# Patient Record
Sex: FEMALE | Race: WHITE
[De-identification: names, ages, dates, MRNs, and addresses within clinical notes are randomized per-mention and may not be internally consistent; named-entity substitution may affect disease eponyms.]

---

## 2023-02-07 ENCOUNTER — HOSPITAL ENCOUNTER (OUTPATIENT)
Dept: HOSPITAL 75 - ORTHO | Age: 73
End: 2023-02-07
Attending: ORTHOPAEDIC SURGERY
Payer: MEDICARE

## 2023-02-07 DIAGNOSIS — M16.12: Primary | ICD-10-CM

## 2023-02-07 PROCEDURE — 99213 OFFICE O/P EST LOW 20 MIN: CPT

## 2023-02-07 PROCEDURE — 73502 X-RAY EXAM HIP UNI 2-3 VIEWS: CPT

## 2023-02-07 NOTE — DIAGNOSTIC IMAGING REPORT
EXAMINATION: Left hip radiographs, 2 views.



COMPARISON: None. 



HISTORY: 72-year-old female, left hip pain. 



FINDINGS: There is sclerosis in the left femoral head very likely

relating to avascular necrosis. There is a subchondral lucency

likely reflecting subchondral collapse. There is moderate to

severe joint space loss of the left hip. There is osteophyte

formation. The left hip is not dislocated. There are pelvic

calcifications likely reflecting phleboliths. There is a

partially imaged probable right hip prosthesis. There are

degenerative changes of the imaged lower lumbar spine. 



IMPRESSION: 

1. Findings suspicious for avascular necrosis of the left femoral

head and subchondral collapse.

2. Advanced osteoarthritis of the left hip. 



Dictated by: 



  Dictated on workstation # [id number]

## 2023-02-27 ENCOUNTER — HOSPITAL ENCOUNTER (OUTPATIENT)
Dept: HOSPITAL 75 - PREOP | Age: 73
End: 2023-02-27
Attending: ORTHOPAEDIC SURGERY
Payer: MEDICARE

## 2023-02-27 VITALS — DIASTOLIC BLOOD PRESSURE: 61 MMHG | SYSTOLIC BLOOD PRESSURE: 124 MMHG

## 2023-02-27 VITALS — HEIGHT: 65 IN | BODY MASS INDEX: 30.05 KG/M2 | WEIGHT: 180.34 LBS

## 2023-02-27 DIAGNOSIS — Z01.818: Primary | ICD-10-CM

## 2023-02-27 LAB
APTT PPP: YELLOW S
BACTERIA #/AREA URNS HPF: (no result) /HPF
BASOPHILS # BLD AUTO: 0.1 10^3/UL (ref 0–0.1)
BASOPHILS NFR BLD AUTO: 1 % (ref 0–10)
BILIRUB UR QL STRIP: NEGATIVE
BUN/CREAT SERPL: 17
CALCIUM SERPL-MCNC: 9.5 MG/DL (ref 8.5–10.1)
CHLORIDE SERPL-SCNC: 105 MMOL/L (ref 98–107)
CO2 SERPL-SCNC: 27 MMOL/L (ref 21–32)
CREAT SERPL-MCNC: 0.81 MG/DL (ref 0.6–1.3)
EOSINOPHIL # BLD AUTO: 0.2 10^3/UL (ref 0–0.3)
EOSINOPHIL NFR BLD AUTO: 2 % (ref 0–10)
FIBRINOGEN PPP-MCNC: CLEAR MG/DL
GFR SERPLBLD BASED ON 1.73 SQ M-ARVRAT: 78 ML/MIN
GLUCOSE SERPL-MCNC: 87 MG/DL (ref 70–105)
GLUCOSE UR STRIP-MCNC: NEGATIVE MG/DL
HCT VFR BLD CALC: 41 % (ref 35–52)
HGB BLD-MCNC: 13.4 G/DL (ref 11.5–16)
KETONES UR QL STRIP: NEGATIVE
LEUKOCYTE ESTERASE UR QL STRIP: NEGATIVE
LYMPHOCYTES # BLD AUTO: 2.2 10^3/UL (ref 1–4)
LYMPHOCYTES NFR BLD AUTO: 23 % (ref 12–44)
MANUAL DIFFERENTIAL PERFORMED BLD QL: NO
MCH RBC QN AUTO: 31 PG (ref 25–34)
MCHC RBC AUTO-ENTMCNC: 33 G/DL (ref 32–36)
MCV RBC AUTO: 94 FL (ref 80–99)
MONOCYTES # BLD AUTO: 0.8 10^3/UL (ref 0–1)
MONOCYTES NFR BLD AUTO: 9 % (ref 0–12)
NEUTROPHILS # BLD AUTO: 6.1 10^3/UL (ref 1.8–7.8)
NEUTROPHILS NFR BLD AUTO: 65 % (ref 42–75)
NITRITE UR QL STRIP: NEGATIVE
PH UR STRIP: 6 [PH] (ref 5–9)
PLATELET # BLD: 252 10^3/UL (ref 130–400)
PMV BLD AUTO: 10.7 FL (ref 9–12.2)
POTASSIUM SERPL-SCNC: 3.9 MMOL/L (ref 3.6–5)
PROT UR QL STRIP: NEGATIVE
RBC #/AREA URNS HPF: (no result) /HPF
SODIUM SERPL-SCNC: 141 MMOL/L (ref 135–145)
SP GR UR STRIP: 1.02 (ref 1.02–1.02)
SQUAMOUS #/AREA URNS HPF: (no result) /HPF
WBC # BLD AUTO: 9.4 10^3/UL (ref 4.3–11)
WBC #/AREA URNS HPF: (no result) /HPF

## 2023-02-27 PROCEDURE — 71046 X-RAY EXAM CHEST 2 VIEWS: CPT

## 2023-02-27 PROCEDURE — 81000 URINALYSIS NONAUTO W/SCOPE: CPT

## 2023-02-27 PROCEDURE — 80048 BASIC METABOLIC PNL TOTAL CA: CPT

## 2023-02-27 PROCEDURE — 87081 CULTURE SCREEN ONLY: CPT

## 2023-02-27 PROCEDURE — 93005 ELECTROCARDIOGRAM TRACING: CPT

## 2023-02-27 PROCEDURE — 36415 COLL VENOUS BLD VENIPUNCTURE: CPT

## 2023-02-27 PROCEDURE — 85025 COMPLETE CBC W/AUTO DIFF WBC: CPT

## 2023-02-27 NOTE — DIAGNOSTIC IMAGING REPORT
INDICATION:  Preoperative evaluation, undergoing left total hip

arthroplasty..  



TECHNIQUE:  Two view chest   8:31 AM



CORRELATION STUDY:   None



FINDINGS: 

The heart size, mediastinal configuration and pulmonary

vasculature are within normal limits.  

The lungs are clear with no consolidating infiltrate. There is no

significant pleural effusion or pneumothorax.  

Rather advanced degenerative changes thoracic spine with disc

space narrowing and osteophyte formation.



IMPRESSION: 

1. Negative for acute abnormality of the chest.



Dictated by: 



  Dictated on workstation # [id number]

## 2023-03-06 ENCOUNTER — HOSPITAL ENCOUNTER (OUTPATIENT)
Dept: HOSPITAL 75 - SDC | Age: 73
LOS: 2 days | Discharge: HOME | End: 2023-03-08
Attending: ORTHOPAEDIC SURGERY
Payer: MEDICARE

## 2023-03-06 VITALS — DIASTOLIC BLOOD PRESSURE: 115 MMHG | SYSTOLIC BLOOD PRESSURE: 143 MMHG

## 2023-03-06 VITALS — DIASTOLIC BLOOD PRESSURE: 66 MMHG | SYSTOLIC BLOOD PRESSURE: 135 MMHG

## 2023-03-06 VITALS — DIASTOLIC BLOOD PRESSURE: 64 MMHG | SYSTOLIC BLOOD PRESSURE: 147 MMHG

## 2023-03-06 VITALS — WEIGHT: 180.34 LBS | HEIGHT: 64.96 IN | BODY MASS INDEX: 30.05 KG/M2

## 2023-03-06 VITALS — SYSTOLIC BLOOD PRESSURE: 109 MMHG | DIASTOLIC BLOOD PRESSURE: 61 MMHG

## 2023-03-06 VITALS — DIASTOLIC BLOOD PRESSURE: 87 MMHG | SYSTOLIC BLOOD PRESSURE: 153 MMHG

## 2023-03-06 VITALS — DIASTOLIC BLOOD PRESSURE: 73 MMHG | SYSTOLIC BLOOD PRESSURE: 139 MMHG

## 2023-03-06 VITALS — DIASTOLIC BLOOD PRESSURE: 82 MMHG | SYSTOLIC BLOOD PRESSURE: 160 MMHG

## 2023-03-06 VITALS — DIASTOLIC BLOOD PRESSURE: 54 MMHG | SYSTOLIC BLOOD PRESSURE: 119 MMHG

## 2023-03-06 VITALS — DIASTOLIC BLOOD PRESSURE: 82 MMHG | SYSTOLIC BLOOD PRESSURE: 169 MMHG

## 2023-03-06 VITALS — SYSTOLIC BLOOD PRESSURE: 166 MMHG | DIASTOLIC BLOOD PRESSURE: 83 MMHG

## 2023-03-06 VITALS — SYSTOLIC BLOOD PRESSURE: 126 MMHG | DIASTOLIC BLOOD PRESSURE: 62 MMHG

## 2023-03-06 DIAGNOSIS — G89.18: ICD-10-CM

## 2023-03-06 DIAGNOSIS — E11.42: ICD-10-CM

## 2023-03-06 DIAGNOSIS — F17.210: ICD-10-CM

## 2023-03-06 DIAGNOSIS — E78.5: ICD-10-CM

## 2023-03-06 DIAGNOSIS — I50.9: ICD-10-CM

## 2023-03-06 DIAGNOSIS — J44.9: ICD-10-CM

## 2023-03-06 DIAGNOSIS — I11.0: ICD-10-CM

## 2023-03-06 DIAGNOSIS — M87.852: Primary | ICD-10-CM

## 2023-03-06 DIAGNOSIS — E66.9: ICD-10-CM

## 2023-03-06 PROCEDURE — 85007 BL SMEAR W/DIFF WBC COUNT: CPT

## 2023-03-06 PROCEDURE — 80053 COMPREHEN METABOLIC PANEL: CPT

## 2023-03-06 PROCEDURE — 82947 ASSAY GLUCOSE BLOOD QUANT: CPT

## 2023-03-06 PROCEDURE — 36415 COLL VENOUS BLD VENIPUNCTURE: CPT

## 2023-03-06 PROCEDURE — 94664 DEMO&/EVAL PT USE INHALER: CPT

## 2023-03-06 PROCEDURE — 85027 COMPLETE CBC AUTOMATED: CPT

## 2023-03-06 PROCEDURE — 85025 COMPLETE CBC W/AUTO DIFF WBC: CPT

## 2023-03-06 PROCEDURE — 72170 X-RAY EXAM OF PELVIS: CPT

## 2023-03-06 PROCEDURE — 87081 CULTURE SCREEN ONLY: CPT

## 2023-03-06 PROCEDURE — 94760 N-INVAS EAR/PLS OXIMETRY 1: CPT

## 2023-03-06 PROCEDURE — 94640 AIRWAY INHALATION TREATMENT: CPT

## 2023-03-06 RX ADMIN — SODIUM CHLORIDE SCH MLS/HR: 900 INJECTION, SOLUTION INTRAVENOUS at 11:22

## 2023-03-06 RX ADMIN — ROSUVASTATIN CALCIUM SCH MG: 20 TABLET, FILM COATED ORAL at 20:05

## 2023-03-06 RX ADMIN — SODIUM CHLORIDE SCH MLS/HR: 900 INJECTION, SOLUTION INTRAVENOUS at 13:12

## 2023-03-06 RX ADMIN — FLUTICASONE PROPIONATE AND SALMETEROL SCH EACH: 232; 14 POWDER, METERED RESPIRATORY (INHALATION) at 19:15

## 2023-03-06 RX ADMIN — ACETAMINOPHEN, DIPHENHYDRAMINE HCL, PHENYLEPHRINE HCL SCH MG: 325; 25; 5 TABLET ORAL at 20:05

## 2023-03-06 RX ADMIN — SODIUM CHLORIDE, SODIUM LACTATE, POTASSIUM CHLORIDE, AND CALCIUM CHLORIDE PRN MLS/HR: 600; 310; 30; 20 INJECTION, SOLUTION INTRAVENOUS at 07:07

## 2023-03-06 RX ADMIN — SODIUM CHLORIDE, SODIUM LACTATE, POTASSIUM CHLORIDE, AND CALCIUM CHLORIDE PRN MLS/HR: 600; 310; 30; 20 INJECTION, SOLUTION INTRAVENOUS at 08:28

## 2023-03-06 RX ADMIN — DOCUSATE SODIUM SCH MG: 100 CAPSULE ORAL at 20:05

## 2023-03-06 RX ADMIN — SODIUM CHLORIDE SCH MLS/HR: 900 INJECTION, SOLUTION INTRAVENOUS at 15:40

## 2023-03-06 RX ADMIN — ASPIRIN SCH MG: 81 TABLET ORAL at 17:00

## 2023-03-06 RX ADMIN — CELECOXIB SCH MG: 100 CAPSULE ORAL at 20:06

## 2023-03-06 RX ADMIN — NICOTINE SCH MG: 21 PATCH, EXTENDED RELEASE TRANSDERMAL at 18:52

## 2023-03-06 RX ADMIN — SODIUM CHLORIDE SCH MLS/HR: 900 INJECTION, SOLUTION INTRAVENOUS at 23:15

## 2023-03-06 RX ADMIN — POTASSIUM CHLORIDE SCH MEQ: 750 TABLET, FILM COATED, EXTENDED RELEASE ORAL at 20:05

## 2023-03-06 NOTE — DIAGNOSTIC IMAGING REPORT
PELVIS 1 TO 2 VIEWS



INDICATION: Postop imaging immediately following left total hip

arthroplasty



COMPARISON: None available.



TECHNIQUE: AP view of the pelvis



FINDINGS: Noncemented left total hip arthroplasty has components

in good alignment. No acute periprosthetic fracture. Expected

postoperative soft tissue gas around the left hip. Right total

hip arthroplasty is also noted.



IMPRESSION: No complication after left total hip arthroplasty.



Dictated by: 



  Dictated on workstation # [id number]

## 2023-03-06 NOTE — CONSULTATION
MASOOD NUNEZ 3/6/23 1358:


HPI


History of Present Illness:


HPI/Chief Complaint


CC: s/p L LUIS FELIPE POD#0





HPI: Mai is a 73 yo F who presents following a Left LUIS FELIPE on POD#0, consulted 

for medical management. PMHx of COPD, HTN, CHF, T2DM. Med list reviewed an 

discussed with patient. Not on o2 at home, does not use cpap. Denies numbness, 

tingling, fever/chills, sob, cp, palpitations, abdominal pain, nausea, vomiting,

vision change, hearing change, hemoptysis, edema, loss of appetite, weight 

change. Endorses cough at bedside productive of white sputum, nocturnal cough, L

hip pain 8/10.


Source:  patient


Exam Limitations:  no limitations


Date Seen


3/6/23


Attending Physician


Tawny Boyd DO


PCP


Admitting Physician:


 








Attending Physician:


Thiago Suh MD


Referring Physician


Dr Suh


Date of Admission








Home Medications & Allergies


Home Medications


Reviewed patient Home Medication Reconciliation performed by pharmacy medication

reconciliations technician and/or nursing.


Patients Allergies have been reviewed.





Allergies





Allergies


Coded Allergies


  No Known Drug Allergies (Unverified2/27/23)








Past Medical-Social-Family Hx


Patient Social History


Smoking Status:  Current Everyday Smoker





Immunizations Up To Date


Date of Influenza Vaccine:  Jan 18, 2023


First/Initial COVID19 Vaccinat:  9/11/21 P


Second COVID19 Vaccination Bob:  10/2/21 P





Seasonal Allergies


Seasonal Allergies:  No





Current Status


Is interpretation needed?:  No





Past Medical History


Surgeries:  Appendectomy, Gallbladder, Hysterectomy, Tonsillectomy


Asthma, Pneumonia, Chronic Bronchitis, Sleep Apnea, COPD, Emphysema


Currently Using CPAP:  No


Currently Using BIPAP:  No


High Cholesterol, Hypertension


GYN History:  Hysterectomy


Gastroesophageal Reflux, Pancreatitis, Polyps, Gall Bladder Disease


Degenerate Disk Disease, Arthritis


Diabetes, Non-Insulin dep


Sleep Difficulties, Anxiety, Depression


Blood Disorders:  No





Review of Systems


Constitutional:  see HPI


All Other Systems Reviewed


Negative Unless Noted:  Yes





Physical Exam


Physical Exam


Vital Signs





Vital Signs - First Documented








 3/6/23





 06:35


 


Temp 36.5


 


Pulse 66


 


Resp 18


 


B/P (MAP) 139/73 (95)


 


Pulse Ox 95


 


O2 Delivery Room Air





Capillary Refill : Less Than 3 Seconds


Height, Weight, BMI


Height: '"


Weight: lbs. oz. kg; 30.04 BMI


Method:


General Appearance:  No Apparent Distress


Eyes:  Bilateral Eye Normal Inspection, Bilateral Eye PERRL, Bilateral Eye EOMI


HEENT:  PERRL/EOMI, Moist Mucous Membranes, Pharyngeal Erythema


Neck:  Normal Inspection, Non Tender, Supple; No JVD


Respiratory:  Chest Non Tender, Lungs Clear, No Respiratory Distress, Other 

(rhonci LL fields)


Cardiovascular:  Regular Rate, Rhythm, No Edema, No JVD, Other (grade 2 hol

osystolic murmur.)


Gastrointestinal:  Non Tender, Soft; No Distended, No Guarding


Extremity:  No Pedal Edema, Other (distal pulses 2+. L hip surgical wound)


Neurologic/Psychiatric:  Alert, Oriented x3, CNs II-XII Norm as Tested; No 

Sensory Deficit; Other (moves all limbs spontaneously)


Skin:  Normal Color, Warm/Dry; No Petechia





Results


Results/Procedures


Labs


Patient resulted labs reviewed.


Imaging:  Reviewed Imaging Films, Reviewed Imaging Report





Assessment/Plan


Assessment and Plan


Assess & Plan/Chief Complaint


L LUIS FELIPE POD#0


-pain meds ordered


-films reviewed


-PT/OT eval ordered





Hx COPD


-not on baseline o2


-DuoNebs


-Symbicort at home





CHF


HTN


HLD


-w/o edema on exam


-lisinopril 20mg


-lasix 20mg


-rosuvastatin 40mg





T2DM


-hold home meds


-SSI





Peripheral neuropathy


-resume home duloxetine, gabapentin





GI ppx: protonix


diet: regular


DVT ppx: 





Dispo: awaiting rehab unit placement following pt ot evaluation.





TAWNY BOYD DO 3/7/23 0448:


Review of Systems


Constitutional:  see HPI





Physical Exam


Physical Exam


General Appearance:  No Apparent Distress, WD/WN, Chronically ill


Respiratory:  Lungs Clear, Decreased Breath Sounds


Cardiovascular:  Regular Rate, Rhythm





Supervisory-Addendum Brief


Verification & Attestation


Participated in pt care:  history, MDM, physical


Personally performed:  exam, history, MDM, supervision of care


Care discussed with:  Medical Student


Procedures:  n/a


Results interpretation:  Verified all documentation


Verification and Attestation of Medical Student E/M Service





A medical student performed and documented this service in my presence. I 

reviewed and verified all information documented by the medical student and made

modifications to such information, when appropriate. I personally performed the 

physical exam and medical decision making. 





 Tawny Boyd Mar 7, 2023,04:48











MASOOD NUNEZ                Mar 6, 2023 13:58


TAWNY BOYD DO                 Mar 7, 2023 04:48

## 2023-03-06 NOTE — OPERATIVE REPORT - ORTHO
Operative Report


Surgeon (s)/Assistant (s)


Surgeon


HUSSEIN MARTÍNEZ MD


Assistant


n/a





Pre-Operative Diagnosis


Left Hip Avascular Necrosis





Post-Operative Diagnosis


same





Operative Report


Date of Procedure:  Mar 6, 2023


Name of Procedure Performed:  


Left Total Hip Arthroplasty


Description & Findings


After obtaining informed consent and marking the patient in the preoperative 

holding area, the patient did receive antibiotics and was taken to the operating

room.  General anesthesia was induced and patient was positioned in the lateral 

decubitus position with the left side up.  Left lower extremity was prepped and 

draped in the usual sterile fashion.  Surgical timeout was taken.  

Posterolateral approach was utilized.  Capsule and external rotators were taken 

down in one layer.  Femoral neck fracture line was freshened with a saw.  

Femoral head was removed and sized.  Acetabulum was exposed.  Labrum and soft 

tissue was removed from the acetabulum.  Sequential reaming was began beginning 

with a 44 mm reamer and reaming to a 50 mm.  50 mm acetabular shell was placed 

and had good fit.  A trial liner was put into place.





Attention was turned to the femoral side, a cookie cutter osteotome was used to 

removed bone near the greater trochanter.  Canal finder was inserted.  

Sequential broaching was began with a 0 and was broached to a 5.  Trial 127 

degree neck and -4 mm 32 head were put into place.  Leg lengths were grossly 

equal; the hip was stable in position of sleep, and stable in flexion and 

internal rotation but tight.  This was accepted.  Hip was dislocated.  Trial 

components were removed.  Acetabulum was exposed and polyethylene liner was 

placed and locked.  The femoral canal was irrigated.  A size 5 Accolade II was 

impacted into place and set at the same level as the broach.  A -4 32 mm ceramic

head was impacted onto the jose taper of the stem.  Hip was once again located 

and found to have grossly equal leg lengths with stability in position of sleep 

as well as flexion and internal rotation.





Hip was irrigated.  Tranexamic acid has been administered IV for hemostasis.  

The fascial layer was closed with #2 StrataFix.  The subcutaneous layer was 

closed with 2-0 Vicryl.  Skin was closed with 3-0 V-loc.  Wound was dressed with

steri-strips, xeroform, 4x4s, ABD, and tape.  Patient was placed in abduction 

pillow and transferred to a hospital bed without difficulty and was stable to 

the recovery room.


Anesthesia Type


General


Estimated Blood Loss


350 mL


Specimen(s) collected/removed


None











HUSSEIN MARTÍNEZ MD               Mar 6, 2023 09:31

## 2023-03-06 NOTE — PROGRESS NOTE-PRE OPERATIVE
Pre-Operative Progress Note


Date of Available H&P:  Feb 7, 2023


Date H&P Reviewed:  Mar 6, 2023


Time H&P Reviewed:  07:00


History & Physical:  H&P Reviewed, Patient Examed, No changes noted


Pre-Operative Diagnosis:  Left Hip Avascular Necrosis











HUSSEIN MARTÍNEZ MD               Mar 6, 2023 07:09

## 2023-03-06 NOTE — ANESTHESIA-GENERAL POST-OP
General


Patient Condition


Mental Status/LOC:  Same as Preop


Cardiovascular:  Satisfactory


Nausea/Vomiting:  Absent


Respiratory:  Satisfactory


Pain:  Controlled


Complications:  Absent





Post Op Complications


Complications


None





Follow Up Care/Instructions


Patient Instructions


None needed.





Anesthesia/Patient Condition


Patient Condition


Patient is doing well, no complaints, stable vital signs, no apparent adverse 

anesthesia problems.   


No complications reported per nursing.











ALLIE MARTINEZ CRNA             Mar 6, 2023 09:40

## 2023-03-07 VITALS — DIASTOLIC BLOOD PRESSURE: 65 MMHG | SYSTOLIC BLOOD PRESSURE: 149 MMHG

## 2023-03-07 VITALS — DIASTOLIC BLOOD PRESSURE: 62 MMHG | SYSTOLIC BLOOD PRESSURE: 128 MMHG

## 2023-03-07 VITALS — SYSTOLIC BLOOD PRESSURE: 165 MMHG | DIASTOLIC BLOOD PRESSURE: 73 MMHG

## 2023-03-07 VITALS — SYSTOLIC BLOOD PRESSURE: 163 MMHG | DIASTOLIC BLOOD PRESSURE: 70 MMHG

## 2023-03-07 VITALS — DIASTOLIC BLOOD PRESSURE: 60 MMHG | SYSTOLIC BLOOD PRESSURE: 124 MMHG

## 2023-03-07 VITALS — SYSTOLIC BLOOD PRESSURE: 155 MMHG | DIASTOLIC BLOOD PRESSURE: 72 MMHG

## 2023-03-07 VITALS — DIASTOLIC BLOOD PRESSURE: 60 MMHG | SYSTOLIC BLOOD PRESSURE: 126 MMHG

## 2023-03-07 LAB
ALBUMIN SERPL-MCNC: 3.7 GM/DL (ref 3.2–4.5)
ALP SERPL-CCNC: 50 U/L (ref 40–136)
ALT SERPL-CCNC: 20 U/L (ref 0–55)
BASOPHILS # BLD AUTO: 0 10^3/UL (ref 0–0.1)
BASOPHILS NFR BLD AUTO: 0 % (ref 0–10)
BILIRUB SERPL-MCNC: 0.4 MG/DL (ref 0.1–1)
BUN/CREAT SERPL: 20
CALCIUM SERPL-MCNC: 8.6 MG/DL (ref 8.5–10.1)
CHLORIDE SERPL-SCNC: 108 MMOL/L (ref 98–107)
CO2 SERPL-SCNC: 23 MMOL/L (ref 21–32)
CREAT SERPL-MCNC: 0.74 MG/DL (ref 0.6–1.3)
EOSINOPHIL # BLD AUTO: 0 10^3/UL (ref 0–0.3)
EOSINOPHIL NFR BLD AUTO: 0 % (ref 0–10)
GFR SERPLBLD BASED ON 1.73 SQ M-ARVRAT: 86 ML/MIN
GLUCOSE SERPL-MCNC: 115 MG/DL (ref 70–105)
HCT VFR BLD CALC: 34 % (ref 35–52)
HGB BLD-MCNC: 11.1 G/DL (ref 11.5–16)
LYMPHOCYTES # BLD AUTO: 2.1 10^3/UL (ref 1–4)
LYMPHOCYTES NFR BLD AUTO: 14 % (ref 12–44)
MANUAL DIFFERENTIAL PERFORMED BLD QL: YES
MCH RBC QN AUTO: 31 PG (ref 25–34)
MCHC RBC AUTO-ENTMCNC: 33 G/DL (ref 32–36)
MCV RBC AUTO: 96 FL (ref 80–99)
METAMYELOCYTES NFR BLD: 1 %
MONOCYTES # BLD AUTO: 1.4 10^3/UL (ref 0–1)
MONOCYTES NFR BLD AUTO: 9 % (ref 0–12)
MONOCYTES NFR BLD: 10 %
NEUTROPHILS # BLD AUTO: 11.9 10^3/UL (ref 1.8–7.8)
NEUTROPHILS NFR BLD AUTO: 77 % (ref 42–75)
NEUTS BAND NFR BLD MANUAL: 77 %
PLATELET # BLD EST: ADEQUATE 10*3/UL
PLATELET # BLD: 209 10^3/UL (ref 130–400)
PMV BLD AUTO: 10.8 FL (ref 9–12.2)
POTASSIUM SERPL-SCNC: 3.8 MMOL/L (ref 3.6–5)
PROT SERPL-MCNC: 6.3 GM/DL (ref 6.4–8.2)
RBC MORPH BLD: NORMAL
SODIUM SERPL-SCNC: 140 MMOL/L (ref 135–145)
VARIANT LYMPHS NFR BLD MANUAL: 12 %
WBC # BLD AUTO: 15.5 10^3/UL (ref 4.3–11)

## 2023-03-07 RX ADMIN — CELECOXIB SCH MG: 100 CAPSULE ORAL at 08:40

## 2023-03-07 RX ADMIN — GABAPENTIN SCH MG: 300 CAPSULE ORAL at 08:41

## 2023-03-07 RX ADMIN — ASPIRIN SCH MG: 81 TABLET ORAL at 08:41

## 2023-03-07 RX ADMIN — ASPIRIN SCH MG: 81 TABLET ORAL at 18:56

## 2023-03-07 RX ADMIN — ALBUTEROL SULFATE SCH MG: 2.5 SOLUTION RESPIRATORY (INHALATION) at 20:18

## 2023-03-07 RX ADMIN — SODIUM CHLORIDE SCH MLS/HR: 900 INJECTION, SOLUTION INTRAVENOUS at 04:03

## 2023-03-07 RX ADMIN — FAMOTIDINE SCH MG: 20 TABLET, FILM COATED ORAL at 08:40

## 2023-03-07 RX ADMIN — DOCUSATE SODIUM SCH MG: 100 CAPSULE ORAL at 08:40

## 2023-03-07 RX ADMIN — LISINOPRIL SCH MG: 5 TABLET ORAL at 08:41

## 2023-03-07 RX ADMIN — ACETAMINOPHEN, DIPHENHYDRAMINE HCL, PHENYLEPHRINE HCL SCH MG: 325; 25; 5 TABLET ORAL at 20:00

## 2023-03-07 RX ADMIN — FLUTICASONE PROPIONATE AND SALMETEROL SCH EACH: 232; 14 POWDER, METERED RESPIRATORY (INHALATION) at 07:07

## 2023-03-07 RX ADMIN — Medication SCH EA: at 05:26

## 2023-03-07 RX ADMIN — POTASSIUM CHLORIDE SCH MEQ: 750 TABLET, FILM COATED, EXTENDED RELEASE ORAL at 20:00

## 2023-03-07 RX ADMIN — ALBUTEROL SULFATE SCH MG: 2.5 SOLUTION RESPIRATORY (INHALATION) at 07:06

## 2023-03-07 RX ADMIN — CELECOXIB SCH MG: 100 CAPSULE ORAL at 20:00

## 2023-03-07 RX ADMIN — Medication SCH MG: at 08:40

## 2023-03-07 RX ADMIN — ROSUVASTATIN CALCIUM SCH MG: 20 TABLET, FILM COATED ORAL at 20:00

## 2023-03-07 RX ADMIN — POTASSIUM CHLORIDE SCH MEQ: 750 TABLET, FILM COATED, EXTENDED RELEASE ORAL at 08:41

## 2023-03-07 RX ADMIN — DOCUSATE SODIUM SCH MG: 100 CAPSULE ORAL at 20:00

## 2023-03-07 RX ADMIN — FUROSEMIDE SCH MG: 20 TABLET ORAL at 08:41

## 2023-03-07 RX ADMIN — DULOXETINE HYDROCHLORIDE SCH MG: 30 CAPSULE, DELAYED RELEASE ORAL at 08:40

## 2023-03-07 RX ADMIN — NICOTINE SCH MG: 21 PATCH, EXTENDED RELEASE TRANSDERMAL at 08:41

## 2023-03-07 RX ADMIN — FLUTICASONE PROPIONATE AND SALMETEROL SCH EACH: 232; 14 POWDER, METERED RESPIRATORY (INHALATION) at 20:15

## 2023-03-07 NOTE — OCCUPATIONAL THERAPY EVAL
OT Evaluation-General/PLF


Medical Diagnosis


Admission Date





Medical Diagnosis:  Left LUIS FELIPE


Onset Date:  Mar 6, 2023





Therapy Diagnosis


Therapy Diagnosis:  weakness





Precautions


Precautions/Isolations:  Fall Prevention, Standard Precautions





Weight Bear Status


Weight Bearing Restriction:  Weight Bearing/Tolerated


Location Restriction:  L LE





Referral


Physician:  Dr. Suh


Referral Reason:  Activity Tolerance, Self Care, Evaluation/Treatment





Medical History


Current History


s/p LUIS FELIPE LLE





Social History


Home:  Single Level


Current Living Status:  Alone


Entry Into Home:  Level Entry


 Steps Into Home:  0





ADL-Prior Level of Function


SCALE: Activities may be completed with or without assistive devices.





6-Indepedent-patient completes the activity by him/herself with no assistance 

from a helper.


5-Set-up or Clean-up Assistance-helper sets up or cleans up; patient completes 

activity. [place] assists only prior to or  


    following the activity.


4-Supervision or Touching Assistance-helper provides verbal cues and/or 

touching/steadying and/or contact guard assistance as patient completes 

activity. Assistance may be provided   


    throughout the activity or intermittently.


3-Partial/Moderate Assistance-helper does LESS THAN HALF the effort. [place] 

lifts, holds or supports trunk or limbs, but provides less than half the effort.


2-Substantial/Maximal Assistance-helper does MORE THAN HALF the effort. [place] 

lifts or holds trunk or limbs and provides more than half the effort.


7-Dqdwhzllq-vzwwjd does ALL the effort. Patient does none of the effort to 

complete the activity. Or, the assistance of 2 or more helpers is required for 

the patient to complete the  


    activity.


If activity was not attempted, code reason:


7-Patient Refused.


9-Not Applicable-not attempted and the patient did not perform the activity 

before the current illness, exacerbation or injury.


10-Not Attempted due to Environmental Limitations-(lack of equipment, weather 

restraints, etc.).


88-Not Attempted due to Medical Conditions or Safety Concerns.


Self Care:  Independent


Functional Cognition:  Independent


DME/Equipment Comments


has tub shower,


multiple cats at home


Drive Self:  Yes





OT Current Status


Subjective


Resting in bed agreeable to OT





Mental Status/Objective


Patient Orientation:  Person, Place, Time, Situation


Attachments:  IV





Current


Glasses/Contacts:  Yes


Upper Extremity ROM


BUE WNLs


Upper Extremity Strength


BUE WNLs





ADL-Treatment


Eating (QC):  6


Oral Hygiene (QC):  5


Shower/Bathe Self (QC):  7 (declined)


Upper Body Dressing (QC):  5


Lower Body Dressing (QC):  4


On/Off Footwear (QC):  5 (with ADs)


Toileting Hygiene (QC):  5





Other Treatments


OT introduced dressing tools and educaiton for LUIS FELIPE precautions





Education


OT Patient Education:  Correct positioning, Exercise program, Modified ADL 

techniques, Progress toward Goal/Update tx plan, Purpose of tx/functional 

activities, Reviewed precautions, Rehab process, Safety issues, Transfer 

techniques, Use of adapted equipment


Teaching Recipient:  Patient


Teaching Methods:  Demonstration, Discussion


Response to Teaching:  Verbalize Understanding, Return Demonstration





OT Long Term Goals


Long Term Goals


Oral Hygiene (QC):  6


Toileting Hygiene (QC):  6


Shower/Bathe Self (QC):  6


Upper Body Dressing (QC):  6


Lower Body Dressing (QC):  6


On/Off Footwear (QC):  6


1=Demonstrate adherence to instructed precautions during ADL tasks.


2=Patient will verbalize/demonstrate understanding of assistive 

devices/modifications for ADL.


3=Patient will improve strength/tolerance for activity to enable patient to 

perform ADL's.





OT Education/Plan


Problem List/Assessment


Assessment:  Decreased Activ Tolerance, Impaired Funct Balance, Impaired Self-

Care Skills





Discharge Recommendations


Plan/Recommendations:  Continue POC


Therapy Discharge Recommendati:  Home & Family


Equpiment Recommendations-D/C:  Reacher





Treatment Plan/Plan of Care


Treatment,Training & Education:  Yes


Patient would benefit from OT for education, treatment and training to promote 

independence in ADL's, mobility, safety and/or upper extremity function for 

ADL's.


Plan of Care:  ADL Retraining, Functional Mobility, Group Exercise/Act as Ind, 

UE Funct Exercise/Act


Treatment Duration:  Mar 11, 2023


Frequency:  3 times per week (3-5 times per week)


Estimated Hrs Per Day:  .25 hour per day


Rehab Potential:  Good





Time


Start Time:  13:20


Stop Time:  13:55


DATE:  Mar 7, 2023


Total Time Billed (hr/min):  35


Billed Treatment Time


EVM 1, ADL 1 35











DRINNEN,MICHELLE OT             Mar 7, 2023 15:42

## 2023-03-07 NOTE — PHYSICAL THERAPY DAILY NOTE
PT Daily Note-Current


Subjective


Patient agrees to PT.





Pain





   Numeric Pain Scale:  5-Moderate Pain


   Location:  Left


   Location Body Site:  Hip


   Pain Description:  Acute


Section J - Health Conditions


1. Rarely or not at all


2. Occasionally


3. Frequently


4. Almost constantly


8. Unable to answer


Pain Effect on Sleep:  1


Pain Interference with Therapy:  1


Pain Interference w/Day-to-Day:  1





Mental Status


Patient Orientation:  Normal For Age





Transfers


SCALE: Activities may be completed with or without assistive devices.





6-Indepedent-patient completes the activity by him/herself with no assistance 

from a helper.


5-Set-up or Clean-up Assistance-helper sets up or cleans up; patient completes 

activity. [place] assists only prior to or  


    following the activity.


4-Supervision or Touching Assistance-helper provides verbal cues and/or 

touching/steadying and/or contact guard assistance as patient completes 

activity. Assistance may be provided   


    throughout the activity or intermittently.


3-Partial/Moderate Assistance-helper does LESS THAN HALF the effort. [place] 

lifts, holds or supports trunk or limbs, but provides less than half the effort.


2-Substantial/Maximal Assistance-helper does MORE THAN HALF the effort. [place] 

lifts or holds trunk or limbs and provides more than half the effort.


6-Mjqkjxajd-ocsvsm does ALL the effort. Patient does none of the effort to 

complete the activity. Or, the assistance of 2 or more helpers is required for 

the patient to complete the  


    activity.


If activity was not attempted, code reason:


7-Patient Refused.


9-Not Applicable-not attempted and the patient did not perform the activity 

before the current illness, exacerbation or injury.


10-Not Attempted due to Environmental Limitations-(lack of equipment, weather 

restraints, etc.).


88-Not Attempted due to Medical Conditions or Safety Concerns.


Sit to Lying (QC):  4


Sit to Stand (QC):  6





Weight Bearing


Full Weight Bearing


Left Lower Extremity:  Left


Weight Bearing/Tolerated





Gait Training


Distance:  250'


Walk 10 feet (QC):  5


Walk 50 ft with 2 Turns(QC):  5


Walk 150 ft (QC):  5


Gait Assistive Device:  FWW


reciprocal pattern/slightly antalgic





Assessment


Patient progressing with treatment plan.  PT to continue to increase activity.





PT Long Term Goals


Long Term Goals


PT Long Term Goals Time Frame:  Mar 31, 2023


Roll Left & Right (QC):  6


Sit to Lying (QC):  6


Lying-Sitting on Side/Bed(QC):  6


Sit to Stand (QC):  6


Chair/Bed-to-Chair Xfer(QC):  6


Toilet Transfer (QC):  6


Does the Patient Walk:  Yes


Walk 10 feet (QC):  6


Walk 50ft with 2 Turns (QC):  6


Walk 150 ft (QC):  6





PT Plan


Treatment/Plan


Treatment Plan:  Continue Plan of Care


Treatment Plan:  Bed Mobility, Education, Functional Activity Gilles, Functional 

Strength, Group Therapy, Gait, Safety, Therapeutic Exercise, Transfers


Treatment Duration:  Mar 31, 2023


Frequency:  11 times per week


Estimated Hrs Per Day:  .25 hour per day





Time


Time In:  1355


Time Out:  1405


DATE:  Mar 7, 2023


Total Billed Treatment Time:  10


Total Billed Treatment


1 visit


FA 10 min











SARMAD ZHOU PT               Mar 7, 2023 14:25

## 2023-03-07 NOTE — PHYSICAL THERAPY DAILY NOTE
PT Daily Note-Current


Subjective


Patient agrees to PT.  No c/o at this time.





Pain





   Numeric Pain Scale:  5-Moderate Pain


   Location:  Left


   Location Body Site:  Hip


   Pain Description:  Acute


Section J - Health Conditions


1. Rarely or not at all


2. Occasionally


3. Frequently


4. Almost constantly


8. Unable to answer


Pain Effect on Sleep:  1


Pain Interference with Therapy:  1


Pain Interference w/Day-to-Day:  1





Mental Status


Patient Orientation:  Normal For Age


Attachments:  [place] Catheter





Transfers


SCALE: Activities may be completed with or without assistive devices.





6-Indepedent-patient completes the activity by him/herself with no assistance 

from a helper.


5-Set-up or Clean-up Assistance-helper sets up or cleans up; patient completes 

activity. [place] assists only prior to or  


    following the activity.


4-Supervision or Touching Assistance-helper provides verbal cues and/or 

touching/steadying and/or contact guard assistance as patient completes 

activity. Assistance may be provided   


    throughout the activity or intermittently.


3-Partial/Moderate Assistance-helper does LESS THAN HALF the effort. [place] 

lifts, holds or supports trunk or limbs, but provides less than half the effort.


2-Substantial/Maximal Assistance-helper does MORE THAN HALF the effort. [place] 

lifts or holds trunk or limbs and provides more than half the effort.


1-Lldlwlstt-boikfy does ALL the effort. Patient does none of the effort to 

complete the activity. Or, the assistance of 2 or more helpers is required for 

the patient to complete the  


    activity.


If activity was not attempted, code reason:


7-Patient Refused.


9-Not Applicable-not attempted and the patient did not perform the activity 

before the current illness, exacerbation or injury.


10-Not Attempted due to Environmental Limitations-(lack of equipment, weather 

restraints, etc.).


88-Not Attempted due to Medical Conditions or Safety Concerns.


Sit to Stand (QC):  4


Chair/Bed-to-Chair Xfer(QC):  4


SBA with all mobility





Weight Bearing


Full Weight Bearing


Left Lower Extremity:  Left


Weight Bearing/Tolerated





Gait Training


Distance:  375'


Walk 10 feet (QC):  4


Walk 50 ft with 2 Turns(QC):  4


Walk 150 ft (QC):  4


Gait Assistive Device:  FWW


steady, reciprocal pattern





Exercises


Seated Therapy Exercises:  Ankle pumps, Long arc quads, Hip flexion


Seated Reps:  15 (x 3 sets)





Assessment


Patient progressing with treatment plan and is SBA with all mobility.  Patient 

remains up in recliner with needs met.  PT to continue to increase activity as 

tolerated by patient.





PT Long Term Goals


Long Term Goals


PT Long Term Goals Time Frame:  Mar 31, 2023


Roll Left & Right (QC):  6


Sit to Lying (QC):  6


Lying-Sitting on Side/Bed(QC):  6


Sit to Stand (QC):  6


Chair/Bed-to-Chair Xfer(QC):  6


Toilet Transfer (QC):  6


Does the Patient Walk:  Yes


Walk 10 feet (QC):  6


Walk 50ft with 2 Turns (QC):  6


Walk 150 ft (QC):  6





PT Plan


Treatment/Plan


Treatment Plan:  Continue Plan of Care


Treatment Plan:  Bed Mobility, Education, Functional Activity Gilles, Functional 

Strength, Group Therapy, Gait, Safety, Therapeutic Exercise, Transfers


Treatment Duration:  Mar 31, 2023


Frequency:  11 times per week


Estimated Hrs Per Day:  .25 hour per day





Time


Time In:  901


Time Out:  924


DATE:  Mar 7, 2023


Total Billed Treatment Time:  23


Total Billed Treatment


1 visit


EX 10 min


GT 13 min











SARMAD ZHOU PT               Mar 7, 2023 10:02

## 2023-03-07 NOTE — PROGRESS NOTE - ORTHO
Progress Note


Subjective


Date of Exam


3/7/23


Chief Complaint


POD #1 L LUIS FELIPE


HPI/Events since last exam


doing well, up in chair, tolerating diet


Review of Systems


-


Allergies:  


Coded Allergies:  


     No Known Drug Allergies (Unverified , 2/27/23)


Home Meds


Reported Medications


Ibuprofen (Ibuprofen) 200 Mg Tablet, 200 MG PO Q6H for Pain, TAB


   2/27/23


Budesonide/Formoterol Fumarate (Symbicort 160-4.5 Mcg Inhaler) 160 Mcg-4.5 

Mcg/Actuation Hfa.aer.ad, 2 PUFF IH BID, EA


   2/27/23


Albuterol Sulfate (Albuterol Sulfate) 0.63 Mg/3 Ml Vial.neb, 0.63 MG IH QID PRN 

for DYSPNEA, EACH


   2/27/23


Ondansetron (Ondansetron Odt) 4 Mg Tab.rapdis, 4 MG SL BID PRN for 

NAUSEA/VOMITING, TAB


   2/27/23


Gabapentin (Gabapentin) 300 Mg/6 Ml (6 Ml) Solution, 300 MG PO DAILY, EA


   2/27/23


Furosemide (Furosemide) 20 Mg Tablet, 20 MG PO DAILY, TAB


   2/27/23


Lisinopril (Lisinopril) 5 Mg Tablet, 5 MG PO DAILY, TAB


   2/27/23


Famotidine (Acid Reducer (FAMOTIDINE)) 20 Mg Tablet, 20 MG PO DAILY, TAB


   2/27/23


Magnesium Oxide (Magnesium Oxide) 400 Mg Tablet, 400 MG PO DAILY, TAB


   2/27/23


Melatonin (Melatonin) 5 Mg Capsule, 5 MG PO HS, CAP


   2/27/23


Aspirin (Aspirin) 81 Mg Tab.chew, 81 MG PO DAILY, TAB


   2/27/23


[Vit B12]   No Conflict Check, PO DAILY


   2/27/23


Duloxetine HCl (Duloxetine HCl) 30 Mg Capsule.dr, 30 MG PO DAILY, CAP


   2/27/23


Vitc/E/Zinc/Copper/Lutein/Zeax (Icaps Areds2 Tablet) 250 Mg-200 Tablet, 1 EACH 

PO BID, TAB


   2/27/23


Multivitamin with Minerals (Hair, Skin & Nails) 1 Each Tablet, 1 EACH PO DAILY, 

TAB


   2/27/23


Semaglutide (Ozempic) 2 Mg/0.75 Ml (8 Mg/3 Ml) Pen.injctr, 2 MG SQ WEEK, VIAL


   2/27/23


Multivit-Min/FA/Lycopene/Lut (Centrum Silver Tablet) 0.4 Mg-300 Mcg-250 Mcg 

Tablet, 1 EACH PO DAILY, TAB


   2/27/23


Potassium Chloride (Potassium Chloride) 10 Meq Capsule.er, 10 MEQ PO BID, CAP


   2/27/23


Rosuvastatin Calcium (Rosuvastatin Calcium) 40 Mg Tablet, 40 MG PO HS, TAB


   2/27/23


Omega-3 Fatty Acids/Fish Oil (Omega 3 1,000 mg Softgel) 300 Mg-1,000 Mg Capsule,

1 EACH PO DAILY, CAP


   2/27/23


Metformin HCl (Metformin HCl ER) 500 Mg Tab.er.24, 500 MG PO BID, TAB


   2/27/23





Objective


Exam


L Hip:  Dressing C/D/I, +DF of ankle, no s/s of DVT


Vital Signs





Vital Signs








  Date Time  Temp Pulse Resp B/P (MAP) Pulse Ox O2 Delivery O2 Flow Rate FiO2


 


3/7/23 07:34 36.7 73 18 149/65 (93) 95 Room Air  


 


3/7/23 07:07     93 Room Air  


 


3/7/23 03:52 36.1 71 20 155/72 (99) 93 Room Air 0.00 





       0.00 


 


3/6/23 23:23 36.5 87 20 135/66 (89) 93 Room Air 0.00 





       0.00 


 


3/6/23 21:00     94 Room Air  


 


3/6/23 19:17     95 Room Air  


 


3/6/23 19:01 36.3 87 20 147/64 (91) 93 Room Air  


 


3/6/23 15:18 36.5 86 20 109/61 (77) 93 Room Air  


 


3/6/23 14:40     93 Room Air  


 


3/6/23 11:41     93 Room Air  


 


3/6/23 11:20 36.3 78 18 119/54 (75) 92 Room Air  


 


3/6/23 10:20 36.5  15 126/62 (83) 93 Room Air  


 


3/6/23 10:19      Room Air  


 


3/6/23 10:10      Room Air  


 


3/6/23 10:10   14 169/82 (111) 94 OxyMask 1.00 


 


3/6/23 10:00   16 166/83 (110) 95 OxyMask 2.00 


 


3/6/23 10:00       2.00 


 


3/6/23 09:50   13 160/82 (108) 97 OxyMask 2.00 


 


3/6/23 09:45       8.00 


 


3/6/23 09:40   17 143/115 (124) 99 OxyMask 8.00 


 


3/6/23 09:32 36.5  20 153/87 (109) 96 OxyMask 8.00 


 


3/6/23 09:32       8.00 














I & O 


 


 3/7/23





 07:00


 


Intake Total 4290 ml


 


Output Total 3210 ml


 


Balance 1080 ml








Lab Results


Laboratory Tests


3/7/23 05:27: 


Sodium Level 140, Potassium Level 3.8, Chloride Level 108H, Carbon Dioxide Level

23, Anion Gap 9, Blood Urea Nitrogen 15, Creatinine 0.74, Estimat Glomerular 

Filtration Rate 86, BUN/Creatinine Ratio 20, Glucose Level 115H, Calcium Level 

8.6, Corrected Calcium 8.8, Total Bilirubin 0.4, Aspartate Amino Transf 

(AST/SGOT) 28, Alanine Aminotransferase (ALT/SGPT) 20, Alkaline Phosphatase 50, 

Total Protein 6.3L, Albumin 3.7


3/7/23 05:28: 


White Blood Count 15.5H, Red Blood Count 3.55L, Hemoglobin 11.1L, Hematocrit 34L

, Mean Corpuscular Volume 96, Mean Corpuscular Hemoglobin 31, Mean Corpuscular 

Hemoglobin Concent 33, Red Cell Distribution Width 14.6H, Platelet Count 209, 

Mean Platelet Volume 10.8, Immature Granulocyte % (Auto) 0, Neutrophils (%) 

(Auto) 77H, Lymphocytes (%) (Auto) 14, Monocytes (%) (Auto) 9, Eosinophils (%) 

(Auto) 0, Basophils (%) (Auto) 0, Neutrophils # (Auto) 11.9H, Lymphocytes # 

(Auto) 2.1, Monocytes # (Auto) 1.4H, Eosinophils # (Auto) 0.0, Basophils # 

(Auto) 0.0, Immature Granulocyte # (Auto) 0.1, Neutrophils % (Manual) 77, 

Lymphocytes % (Manual) 12, Monocytes % (Manual) 10, Metamyelocytes % 1, Platelet

Estimate ADEQUATE, Blood Morphology Comment NORMAL





Microbiology


3/6/23 MRSA Screen - Final, Complete


         MRSA not isolated


Imaging


Postop AP pelvis dated 3/6/23 were reviewed from PACS and demonstrated bilateral

total hip arthroplasties with components in good position





Assessment and Plan


Assessment


Left Hip Primary OA s/p LUIS FELIPE


Problem List


Left Hip Primary OA s/p LUIS FELIPE


Plan


PT/OT


DVT Prophylaxis


Possible rehab





Final Diagonsis


Left Hip Primary OA s/p LUIS FELIPE


Level of the visit:  Level 3 (global)











HUSSEIN MARTÍNEZ MD               Mar 7, 2023 09:29

## 2023-03-07 NOTE — PROGRESS NOTE
MASOOD NUNEZ 3/7/23 1152:


Subjective


Date Seen by a Provider:  Mar 7, 2023


Time Seen by a Provider:  09:00


Subjective/Events-last exam


Mai is feeling well today. Rates her pain as 8/10 but says the pain meds are 

helping, and she say she is eager to start PT/OT so she can get home. She says 

the duonebs are helping with her cough but the albuterol makes her throat burn. 

Denies numbness/tingling, dyspnea.


Review of Systems


Pulmonary:  No Dyspnea; Cough


Musculoskeletal:  leg pain


Neurological:  No: Numbness





Objective


Exam


Last Set of Vital Signs





Vital Signs








  Date Time  Temp Pulse Resp B/P (MAP) Pulse Ox O2 Delivery O2 Flow Rate FiO2


 


3/7/23 11:18 36.8 85 18 128/62 (84) 96 Room Air  


 


3/7/23 03:52       0.00 





       0.00 





Capillary Refill : Less Than 3 Seconds


I&O











Intake and Output 


 


 3/7/23





 00:00


 


Intake Total 3790 ml


 


Output Total 1860 ml


 


Balance 1930 ml


 


 


 


Intake Oral 1740 ml


 


IV Total 2050 ml


 


Output Urine Total 1860 ml








General:  Alert, Oriented X3, Cooperative, No Acute Distress


HEENT:  Atraumatic, PERRLA, EOMI, Mucous Memb Moist/Pink


Neck:  Supple, No JVD


Lungs:  Clear to Auscultation, Normal Air Movement


Heart:  Regular Rate, Other (2/6 holosystolic murmur)


Abdomen:  Normal Bowel Sounds


Neuro:  Normal Speech, Sensation Intact, Other (moves all extremities 

spontaneously)


Psych/Mental Status:  Mental Status NL





Results


Lab


Laboratory Tests


3/7/23 05:27: 


Sodium Level 140, Potassium Level 3.8, Chloride Level 108H, Carbon Dioxide Level

23, Anion Gap 9, Blood Urea Nitrogen 15, Creatinine 0.74, Estimat Glomerular 

Filtration Rate 86, BUN/Creatinine Ratio 20, Glucose Level 115H, Calcium Level 

8.6, Corrected Calcium 8.8, Total Bilirubin 0.4, Aspartate Amino Transf 

(AST/SGOT) 28, Alanine Aminotransferase (ALT/SGPT) 20, Alkaline Phosphatase 50, 

Total Protein 6.3L, Albumin 3.7


3/7/23 05:28: 


White Blood Count 15.5H, Red Blood Count 3.55L, Hemoglobin 11.1L, Hematocrit 34L

, Mean Corpuscular Volume 96, Mean Corpuscular Hemoglobin 31, Mean Corpuscular 

Hemoglobin Concent 33, Red Cell Distribution Width 14.6H, Platelet Count 209, 

Mean Platelet Volume 10.8, Immature Granulocyte % (Auto) 0, Neutrophils (%) 

(Auto) 77H, Lymphocytes (%) (Auto) 14, Monocytes (%) (Auto) 9, Eosinophils (%) 

(Auto) 0, Basophils (%) (Auto) 0, Neutrophils # (Auto) 11.9H, Lymphocytes # 

(Auto) 2.1, Monocytes # (Auto) 1.4H, Eosinophils # (Auto) 0.0, Basophils # 

(Auto) 0.0, Immature Granulocyte # (Auto) 0.1, Neutrophils % (Manual) 77, 

Lymphocytes % (Manual) 12, Monocytes % (Manual) 10, Metamyelocytes % 1, Platelet

Estimate ADEQUATE, Blood Morphology Comment NORMAL





Microbiology


3/6/23 MRSA Screen - Final, Complete


         MRSA not isolated





Assessment/Plan


Assessment/Plan


Assess & Plan/Chief Complaint


L LUIS FELIPE POD#1


-pain meds ordered


-films reviewed


-PT/OT eval ordered





Hx COPD


-not on baseline o2


-DuoNebs


-Symbicort at home





CHF


HTN


HLD


-w/o edema on exam


-lisinopril 20mg


-lasix 20mg


-rosuvastatin 40mg





T2DM


-hold home meds


-SSI





Peripheral neuropathy


-resume home duloxetine, gabapentin





Dispo: awaiting rehab unit placement following pt ot evaluation.





TAWNY SOTO DO 3/8/23 0437:


Supervisory-Addendum Brief


Verification & Attestation


Participated in pt care:  history, MDM, physical


Personally performed:  exam, history, MDM, supervision of care


Care discussed with:  Medical Student


Procedures:  n/a


Results interpretation:  Verified all documentation


Verification and Attestation of Medical Student E/M Service





A medical student performed and documented this service in my presence. I 

reviewed and verified all information documented by the medical student and made

modifications to such information, when appropriate. I personally performed the 

physical exam and medical decision making. 





 Tawny Soto Mar 8, 2023,04:37











GUIISSAC MACHADOMASOOD                Mar 7, 2023 11:52


TAWNY SOTO DO                 Mar 8, 2023 04:37

## 2023-03-08 VITALS — DIASTOLIC BLOOD PRESSURE: 65 MMHG | SYSTOLIC BLOOD PRESSURE: 112 MMHG

## 2023-03-08 VITALS — DIASTOLIC BLOOD PRESSURE: 80 MMHG | SYSTOLIC BLOOD PRESSURE: 150 MMHG

## 2023-03-08 VITALS — SYSTOLIC BLOOD PRESSURE: 116 MMHG | DIASTOLIC BLOOD PRESSURE: 67 MMHG

## 2023-03-08 LAB
ALBUMIN SERPL-MCNC: 3.6 GM/DL (ref 3.2–4.5)
ALP SERPL-CCNC: 51 U/L (ref 40–136)
ALT SERPL-CCNC: 18 U/L (ref 0–55)
BASOPHILS # BLD AUTO: 0 10^3/UL (ref 0–0.1)
BASOPHILS NFR BLD AUTO: 0 % (ref 0–10)
BILIRUB SERPL-MCNC: 0.7 MG/DL (ref 0.1–1)
BUN/CREAT SERPL: 16
CALCIUM SERPL-MCNC: 9 MG/DL (ref 8.5–10.1)
CHLORIDE SERPL-SCNC: 106 MMOL/L (ref 98–107)
CO2 SERPL-SCNC: 26 MMOL/L (ref 21–32)
CREAT SERPL-MCNC: 0.76 MG/DL (ref 0.6–1.3)
EOSINOPHIL # BLD AUTO: 0.1 10^3/UL (ref 0–0.3)
EOSINOPHIL NFR BLD AUTO: 1 % (ref 0–10)
GFR SERPLBLD BASED ON 1.73 SQ M-ARVRAT: 84 ML/MIN
GLUCOSE SERPL-MCNC: 122 MG/DL (ref 70–105)
HCT VFR BLD CALC: 33 % (ref 35–52)
HGB BLD-MCNC: 11 G/DL (ref 11.5–16)
LYMPHOCYTES # BLD AUTO: 2.2 10^3/UL (ref 1–4)
LYMPHOCYTES NFR BLD AUTO: 17 % (ref 12–44)
MANUAL DIFFERENTIAL PERFORMED BLD QL: NO
MCH RBC QN AUTO: 31 PG (ref 25–34)
MCHC RBC AUTO-ENTMCNC: 33 G/DL (ref 32–36)
MCV RBC AUTO: 95 FL (ref 80–99)
MONOCYTES # BLD AUTO: 1.4 10^3/UL (ref 0–1)
MONOCYTES NFR BLD AUTO: 11 % (ref 0–12)
NEUTROPHILS # BLD AUTO: 9 10^3/UL (ref 1.8–7.8)
NEUTROPHILS NFR BLD AUTO: 70 % (ref 42–75)
PLATELET # BLD: 192 10^3/UL (ref 130–400)
PMV BLD AUTO: 10.7 FL (ref 9–12.2)
POTASSIUM SERPL-SCNC: 3.7 MMOL/L (ref 3.6–5)
PROT SERPL-MCNC: 6.3 GM/DL (ref 6.4–8.2)
SODIUM SERPL-SCNC: 140 MMOL/L (ref 135–145)
WBC # BLD AUTO: 12.8 10^3/UL (ref 4.3–11)

## 2023-03-08 RX ADMIN — ALBUTEROL SULFATE SCH MG: 2.5 SOLUTION RESPIRATORY (INHALATION) at 09:45

## 2023-03-08 RX ADMIN — LISINOPRIL SCH MG: 5 TABLET ORAL at 08:21

## 2023-03-08 RX ADMIN — DULOXETINE HYDROCHLORIDE SCH MG: 30 CAPSULE, DELAYED RELEASE ORAL at 08:21

## 2023-03-08 RX ADMIN — FUROSEMIDE SCH MG: 20 TABLET ORAL at 08:21

## 2023-03-08 RX ADMIN — NICOTINE SCH MG: 21 PATCH, EXTENDED RELEASE TRANSDERMAL at 08:21

## 2023-03-08 RX ADMIN — FAMOTIDINE SCH MG: 20 TABLET, FILM COATED ORAL at 08:21

## 2023-03-08 RX ADMIN — ASPIRIN SCH MG: 81 TABLET ORAL at 08:21

## 2023-03-08 RX ADMIN — FLUTICASONE PROPIONATE AND SALMETEROL SCH EACH: 232; 14 POWDER, METERED RESPIRATORY (INHALATION) at 09:46

## 2023-03-08 RX ADMIN — DOCUSATE SODIUM SCH MG: 100 CAPSULE ORAL at 08:21

## 2023-03-08 RX ADMIN — CELECOXIB SCH MG: 100 CAPSULE ORAL at 08:21

## 2023-03-08 RX ADMIN — Medication SCH MG: at 08:21

## 2023-03-08 RX ADMIN — Medication SCH EA: at 05:27

## 2023-03-08 RX ADMIN — POTASSIUM CHLORIDE SCH MEQ: 750 TABLET, FILM COATED, EXTENDED RELEASE ORAL at 08:21

## 2023-03-08 RX ADMIN — GABAPENTIN SCH MG: 300 CAPSULE ORAL at 08:21

## 2023-03-08 NOTE — DISCHARGE SUMMARY
Discharge Summary


Hospital Course


Hospital Course


Date of Admission:  3/6/23


Admission Diagnosis :  Left Hip Avascular Necrosis





Family Physician/Provider:   





Date of Discharge: 3/8/23 


Discharge Diagnosis: [ Left Hip Avascular Necrosis s/p Left Total Hip 

Arthroplasty]








Hospital Course:


[On 3/6/23, patient underwent left total hip arthroplasty.  Tolerated the 

procedure well and was admitted to the regular floor.  On the day of surgery, 

began mechanical DVT prophylaxis.  On POD #1, began chemical DVT prophylaxis.  

Made good progress with therapy.  On POD #2, pain was controlled on oral 

medication, was tolerating a regular diet, and doing very well with therapy.  

Ready for discharge to home. ]














Labs and Pending Lab Test:


Laboratory Tests


3/8/23 05:42: 


White Blood Count 12.8H, Red Blood Count 3.51L, Hemoglobin 11.0L, Hematocrit 33L

, Mean Corpuscular Volume 95, Mean Corpuscular Hemoglobin 31, Mean Corpuscular 

Hemoglobin Concent 33, Red Cell Distribution Width 14.8H, Platelet Count 192, 

Mean Platelet Volume 10.7, Immature Granulocyte % (Auto) 0, Neutrophils (%) 

(Auto) 70, Lymphocytes (%) (Auto) 17, Monocytes (%) (Auto) 11, Eosinophils (%) 

(Auto) 1, Basophils (%) (Auto) 0, Neutrophils # (Auto) 9.0H, Lymphocytes # (Auto

) 2.2, Monocytes # (Auto) 1.4H, Eosinophils # (Auto) 0.1, Basophils # (Auto) 

0.0, Immature Granulocyte # (Auto) 0.1, Sodium Level 140, Potassium Level 3.7, 

Chloride Level 106, Carbon Dioxide Level 26, Anion Gap 8, Blood Urea Nitrogen 1

2, Creatinine 0.76, Estimat Glomerular Filtration Rate 84, BUN/Creatinine Ratio 

16, Glucose Level 122H, Calcium Level 9.0, Corrected Calcium 9.3, Total 

Bilirubin 0.7, Aspartate Amino Transf (AST/SGOT) 27, Alanine Aminotransferase 

(ALT/SGPT) 18, Alkaline Phosphatase 51, Total Protein 6.3L, Albumin 3.6





Microbiology


3/6/23 MRSA Screen - Final, Complete


         MRSA not isolated





Home Meds


Active


Oxyir Tablet (Oxycodone HCl) 5 Mg Tab 5 Mg PO Q4H PRN 7 Days


Aspirin EC (Aspirin) 81 Mg Tablet.dr 81 Mg PO BID WITH MEALS 35 Days


Reported


Ibuprofen 200 Mg Tablet 200 Mg PO Q6H


Symbicort 160-4.5 Mcg Inhaler (Budesonide/Formoterol Fumarate) 160 Mcg-4.5 

Mcg/Actuation Hfa.aer.ad 2 Puff IH BID


Albuterol Sulfate 0.63 Mg/3 Ml Vial.neb 0.63 Mg IH QID PRN


Ondansetron Odt (Ondansetron) 4 Mg Tab.rapdis 4 Mg SL BID PRN


Gabapentin 300 Mg/6 Ml (6 Ml) Solution 300 Mg PO DAILY


Furosemide 20 Mg Tablet 20 Mg PO DAILY


Lisinopril 5 Mg Tablet 5 Mg PO DAILY


Acid Reducer (FAMOTIDINE) (Famotidine) 20 Mg Tablet 20 Mg PO DAILY


Magnesium Oxide 400 Mg Tablet 400 Mg PO DAILY


Melatonin 5 Mg Capsule 5 Mg PO HS


Aspirin 81 Mg Tab.chew 81 Mg PO DAILY


[Vit B12]    PO DAILY


Duloxetine HCl 30 Mg Capsule.dr 30 Mg PO DAILY


Icaps Areds2 Tablet (Vitc/E/Zinc/Copper/Lutein/Zeax) 250 Mg-200 Tablet 1 Each PO

BID


Hair, Skin & Nails (Multivitamin with Minerals) 1 Each Tablet 1 Each PO DAILY


Ozempic (Semaglutide) 2 Mg/0.75 Ml (8 Mg/3 Ml) Pen.injctr 2 Mg SQ WEEK


Centrum Silver Tablet (Multivit-Min/FA/Lycopene/Lut) 0.4 Mg-300 Mcg-250 Mcg 

Tablet 1 Each PO DAILY


Potassium Chloride 10 Meq Capsule.er 10 Meq PO BID


Rosuvastatin Calcium 40 Mg Tablet 40 Mg PO HS


Omega 3 1,000 mg Softgel (Omega-3 Fatty Acids/Fish Oil) 300 Mg-1,000 Mg Capsule 

1 Each PO DAILY


Metformin HCl ER (Metformin HCl) 500 Mg Tab.er.24 500 Mg PO BID


Assessment/Pt Instructions


F/U with Dr. Suh in 2 weeks.  Continue walker for assist and WBAT on left leg.

 Adhere to posterior dislocation precautions.  Dry dressing to left hip; change 

at least every other day.





Discharge Physical Examination


Vital Signs





Vital Signs








  Date Time  Temp Pulse Resp B/P (MAP) Pulse Ox O2 Delivery O2 Flow Rate FiO2


 


3/8/23 07:41 36.6 85 18 116/67 (83) 96 Room Air  


 


3/7/23 03:52       0.00 





       0.00 








Extremity:  Other (L Hip:  Incision C/D/I, +DF of ankle, no s/s of DVT)


Allergies:  


Coded Allergies:  


     No Known Drug Allergies (Unverified , 2/27/23)





Discharge Summary


Date of Admission





Date of Discharge














HUSSEIN SUH MD               Mar 8, 2023 09:42

## 2023-03-08 NOTE — PHYSICAL THERAPY DAILY NOTE
PT Daily Note-Current


Subjective


Patient lying supine in bed upon PT arrival, agreeable to treatment.  Rates pain

at 0/10 currently.  Reports she will be discharging this morning.





Pain


Section J - Health Conditions


1. Rarely or not at all


2. Occasionally


3. Frequently


4. Almost constantly


8. Unable to answer


Pain Effect on Sleep:  1


Pain Interference with Therapy:  1


Pain Interference w/Day-to-Day:  1





Mental Status


Patient Orientation:  Person, Place, Time, Situation





Transfers


SCALE: Activities may be completed with or without assistive devices.





6-Indepedent-patient completes the activity by him/herself with no assistance 

from a helper.


5-Set-up or Clean-up Assistance-helper sets up or cleans up; patient completes 

activity. [place] assists only prior to or  


    following the activity.


4-Supervision or Touching Assistance-helper provides verbal cues and/or 

touching/steadying and/or contact guard assistance as patient completes 

activity. Assistance may be provided   


    throughout the activity or intermittently.


3-Partial/Moderate Assistance-helper does LESS THAN HALF the effort. [place] 

lifts, holds or supports trunk or limbs, but provides less than half the effort.


2-Substantial/Maximal Assistance-helper does MORE THAN HALF the effort. [place] 

lifts or holds trunk or limbs and provides more than half the effort.


0-Hdlwnssda-gyonhv does ALL the effort. Patient does none of the effort to 

complete the activity. Or, the assistance of 2 or more helpers is required for 

the patient to complete the  


    activity.


If activity was not attempted, code reason:


7-Patient Refused.


9-Not Applicable-not attempted and the patient did not perform the activity 

before the current illness, exacerbation or injury.


10-Not Attempted due to Environmental Limitations-(lack of equipment, weather 

restraints, etc.).


88-Not Attempted due to Medical Conditions or Safety Concerns.


Roll Left & Right (QC):  4


Sit to Lying (QC):  4


Lying to Sitting/Side of Bed(Q:  4


Sit to Stand (QC):  6


Chair/Bed-to-Chair Xfer(QC):  6


Toilet Transfer (QC):  6





Weight Bearing


Full Weight Bearing


Left Lower Extremity:  Left


Weight Bearing/Tolerated





Gait Training


Does the Patient Walk?:  Yes


Distance:  250 feet


Walk 10 feet (QC):  5


Walk 50 ft with 2 Turns(QC):  5


Walk 150 ft (QC):  5


Gait Assistive Device:  FWW





Assessment


Current Status:  Excellent Progress


Patient demonstrates SBA/I with all bed mobility and transfers.  She uses her 

UE's to move her left LE, however is conscious about her hip precautions.  

Patient ambulates 250 feet with FWW, with setup.  Patient in bed post treatment 

with all needs met,  nursing notified, call light in hand.





PT Long Term Goals


Long Term Goals


PT Long Term Goals Time Frame:  Mar 31, 2023


Roll Left & Right (QC):  6


Sit to Lying (QC):  6


Lying-Sitting on Side/Bed(QC):  6


Sit to Stand (QC):  6


Chair/Bed-to-Chair Xfer(QC):  6


Toilet Transfer (QC):  6


Does the Patient Walk:  Yes


Walk 10 feet (QC):  6


Walk 50ft with 2 Turns (QC):  6


Walk 150 ft (QC):  6





PT Plan


Treatment/Plan


Treatment Plan:  Continue Plan of Care


Treatment Plan:  Bed Mobility, Education, Functional Activity Gilles, Functional 

Strength, Group Therapy, Gait, Safety, Therapeutic Exercise, Transfers


Treatment Duration:  Mar 31, 2023


Frequency:  11 times per week


Estimated Hrs Per Day:  .25 hour per day





Safety Risks/Education


Patient Education:  Gait Training, Transfer Techniques


Teaching Recipient:  Patient


Teaching Methods:  Demonstration, Discussion


Response to Teaching:  Verbalize Understanding, Return Demonstration





Time


Time In:  1005


Time Out:  1018


DATE:  Mar 8, 2023


Total Billed Treatment Time:  13


Total Billed Treatment


Visit, VANDANA Xiong PT                 Mar 8, 2023 11:55

## 2023-03-08 NOTE — D/C HH FACE TO FACE ORDER
D/C  Face to Face Orders


Instructions for Patient


[facility], [phone]


Patient Instructions/FollowUp:  


F/U with Dr. Thiago Suh in 2 weeks.  WBAT on Left leg with walker.


Posterior dislocation precautions.  Dry dressing to left hip; change at


least every other day


Physician to follow Patient:  Thiago Suh


Discharge Diet for Home:  ADA Diet





Patient Data-Allergies,Ht & Wt


Patient Allergies:  


Coded Allergies:  


     No Known Drug Allergies (Unverified , 2/27/23)





Home Health Need/Face to Face


Date of Face to Face:  Mar 8, 2023


Clinical Findings:  Muscle weakness, Pain with ambulation


I have seen Pt face-to-face:  Yes


Discharged To:  Home


Diagnosis/Conditions:  


Left Hip Avascular Necrosis s/p L LUIS FELIPE


Patient is Homebound due to:  Muscle weakness, Pain w/ambulation


Homebound Status


   Due to the above stated illness, injury or surgical procedure (medical 

condition or diagnosis) and associated clinical findings, the patient is 

homebound because of his/her inability to leave home except with aid of a 

supportive device and/or person AND leaving the home requires a considerable and

taxing effort or is medically contraindicated.


Pt req the following assistanc:  Walker





Home Health Nursing Orders


Home Health Services Order:  Physical Therapy-Evaluate & Treat





Home Health Infusion Therapy


Line Start Date:  Mar 6, 2023





Therapy Orders


Therapy Specific Orders:  Gait training, Increase strength/endurance, Restore 

ROM


Certify Stmt


I certify that this patient is under my care and that I, a nurse practitioner or

a physician; a assistant working with me, had a face to face encounter that -

meets the physician face to face encounter requirements with this patient as 

dated.











THIAGO SUH MD               Mar 8, 2023 12:20

## 2023-03-28 ENCOUNTER — HOSPITAL ENCOUNTER (OUTPATIENT)
Dept: HOSPITAL 75 - ORTHO | Age: 73
End: 2023-03-28
Attending: ORTHOPAEDIC SURGERY
Payer: MEDICARE

## 2023-03-28 DIAGNOSIS — Z47.89: Primary | ICD-10-CM

## 2023-04-18 ENCOUNTER — HOSPITAL ENCOUNTER (OUTPATIENT)
Dept: HOSPITAL 75 - ORTHO | Age: 73
End: 2023-04-18
Attending: ORTHOPAEDIC SURGERY
Payer: MEDICARE

## 2023-04-18 DIAGNOSIS — M25.552: Primary | ICD-10-CM

## 2023-04-18 DIAGNOSIS — Z98.890: ICD-10-CM

## 2023-04-18 PROCEDURE — 73502 X-RAY EXAM HIP UNI 2-3 VIEWS: CPT

## 2023-04-18 NOTE — DIAGNOSTIC IMAGING REPORT
INDICATION:  PAIN IN LEFT HIP



TECHNIQUE:  2 views of the left hip.



CORRELATION STUDY:  02/07/2023



FINDINGS: Interval surgical changes of bipolar hip arthroplasty.

Hardware appearing to be intact and in normal alignment.  Soft

tissues unremarkable.



IMPRESSION: 

1. Interval postoperative left hip.



Dictated by: 



  Dictated on workstation # [id number]

## 2023-05-09 NOTE — PROGRESS NOTE
MASOOD NUNEZ 3/8/23 1504:


Subjective


Date Seen by a Provider:  Mar 8, 2023


Time Seen by a Provider:  10:00


Subjective/Events-last exam


Mai is feeling well today, she is seen walking in halls with PT before visit. 

She denies SOB, palpitations, and pain well controlled. She says she is ready to

go home


Review of Systems


Pulmonary:  No Dyspnea; Cough


Cardiovascular:  No: Chest Pain, Palpitations





Objective


Exam


Last Set of Vital Signs





Vital Signs








  Date Time  Temp Pulse Resp B/P (MAP) Pulse Ox O2 Delivery O2 Flow Rate FiO2


 


3/8/23 13:25      Room Air  


 


3/8/23 11:20 36.6 83 18  96   


 


3/7/23 03:52       0.00 





       0.00 





Capillary Refill : Less Than 3 Seconds


I&O











Intake and Output 


 


 3/8/23





 00:00


 


Intake Total 2560 ml


 


Output Total 2650 ml


 


Balance -90 ml


 


 


 


Intake Oral 2560 ml


 


Output Urine Total 2650 ml


 


# Voids 5








General:  Alert, Oriented X3, Cooperative, No Acute Distress


HEENT:  Atraumatic, PERRLA, EOMI


Neck:  Supple, No JVD


Heart:  Regular Rate, Other (holosystolic murmur 2/6)


Abdomen:  Soft, No Tenderness


Extremities:  No Clubbing, No Cyanosis, No Edema, Normal Pulses


Skin:  No Rashes, No Breakdown


Neuro:  Normal Gait, Normal Speech


Psych/Mental Status:  Mental Status NL





Results


Lab


Laboratory Tests


3/8/23 05:42: 


White Blood Count 12.8H, Red Blood Count 3.51L, Hemoglobin 11.0L, Hematocrit 33L

, Mean Corpuscular Volume 95, Mean Corpuscular Hemoglobin 31, Mean Corpuscular 

Hemoglobin Concent 33, Red Cell Distribution Width 14.8H, Platelet Count 192, 

Mean Platelet Volume 10.7, Immature Granulocyte % (Auto) 0, Neutrophils (%) 

(Auto) 70, Lymphocytes (%) (Auto) 17, Monocytes (%) (Auto) 11, Eosinophils (%) 

(Auto) 1, Basophils (%) (Auto) 0, Neutrophils # (Auto) 9.0H, Lymphocytes # 

(Auto) 2.2, Monocytes # (Auto) 1.4H, Eosinophils # (Auto) 0.1, Basophils # 

(Auto) 0.0, Immature Granulocyte # (Auto) 0.1, Sodium Level 140, Potassium Level

3.7, Chloride Level 106, Carbon Dioxide Level 26, Anion Gap 8, Blood Urea Nitr

ogen 12, Creatinine 0.76, Estimat Glomerular Filtration Rate 84, BUN/Creatinine 

Ratio 16, Glucose Level 122H, Calcium Level 9.0, Corrected Calcium 9.3, Total 

Bilirubin 0.7, Aspartate Amino Transf (AST/SGOT) 27, Alanine Aminotransferase 

(ALT/SGPT) 18, Alkaline Phosphatase 51, Total Protein 6.3L, Albumin 3.6





Microbiology


3/6/23 MRSA Screen - Final, Complete


         MRSA not isolated





Assessment/Plan


Assessment/Plan


Assess & Plan/Chief Complaint


L LUIS FELIPE POD#1


-pain meds ordered


-films reviewed


-PT/OT eval ordered





Hx COPD


-not on baseline o2


-DuoNebs


-Symbicort at home





CHF


HTN


HLD


-w/o edema on exam


-lisinopril 20mg


-lasix 20mg


-rosuvastatin 40mg





T2DM


-hold home meds


-SSI





Peripheral neuropathy


-resume home duloxetine, gabapentin





Dispo: dc to home, outpatient pt.





TAWNY SOTO DO 3/8/23 2053:


Supervisory-Addendum Brief


Verification & Attestation


Participated in pt care:  history, MDM, physical


Personally performed:  exam, history, MDM, supervision of care


Care discussed with:  Medical Student


Procedures:  n/a


Results interpretation:  Verified all documentation


Verification and Attestation of Medical Student E/M Service





A medical student performed and documented this service in my presence. I 

reviewed and verified all information documented by the medical student and made

modifications to such information, when appropriate. I personally performed the 

physical exam and medical decision making. 





 Tawny Soto Mar 8, 2023,20:53











MASOOD NUNEZ                Mar 8, 2023 15:04


TAWNY SOTO DO                 Mar 8, 2023 20:53 Pt would like communication of their results via:  [employer]  Telephone Information:   Mobile [phone]     Services.   Name of : [facility] #[id number]  Service used: Video: Third Party       Lexus Rachelle who is here for Consult: seen at the request of PCP for polyarthralgia  PCP is Diann Ospina MD  Previously followed with rheumatologist: 

## 2023-05-30 ENCOUNTER — HOSPITAL ENCOUNTER (OUTPATIENT)
Dept: HOSPITAL 75 - ORTHO | Age: 73
End: 2023-05-30
Attending: ORTHOPAEDIC SURGERY
Payer: MEDICARE

## 2023-05-30 DIAGNOSIS — M19.031: Primary | ICD-10-CM

## 2023-05-30 PROCEDURE — 73110 X-RAY EXAM OF WRIST: CPT

## 2023-05-30 NOTE — DIAGNOSTIC IMAGING REPORT
INDICATION: Right wrist pain



AP, oblique, and lateral views of the right wrist are obtained.



There are calcifications between the scaphoid and lunate which

may represent avulsed fragments or chronic calcifications. There

is narrowing of the radiocarpal joint. There is degenerative

change of the 1st carpal metacarpal joint.



IMPRESSION:



There are calcifications between the scapholunate ligament which

may be avulsed fragments but are of uncertain age. There are

underlying degenerative findings. Consider MRI for further

evaluation if clinically warranted.



Dictated by: 



  Dictated on workstation # [id number]